# Patient Record
Sex: FEMALE | URBAN - METROPOLITAN AREA
[De-identification: names, ages, dates, MRNs, and addresses within clinical notes are randomized per-mention and may not be internally consistent; named-entity substitution may affect disease eponyms.]

---

## 2017-07-12 ENCOUNTER — OFFICE VISIT (OUTPATIENT)
Dept: FAMILY MEDICINE | Facility: CLINIC | Age: 47
End: 2017-07-12
Payer: COMMERCIAL

## 2017-07-12 VITALS — TEMPERATURE: 98.6 F | SYSTOLIC BLOOD PRESSURE: 108 MMHG | DIASTOLIC BLOOD PRESSURE: 62 MMHG

## 2017-07-12 DIAGNOSIS — Z71.84 TRAVEL ADVICE ENCOUNTER: Primary | ICD-10-CM

## 2017-07-12 DIAGNOSIS — Z23 NEED FOR VACCINATION: ICD-10-CM

## 2017-07-12 PROCEDURE — 99402 PREV MED CNSL INDIV APPRX 30: CPT | Mod: 25 | Performed by: NURSE PRACTITIONER

## 2017-07-12 PROCEDURE — 90471 IMMUNIZATION ADMIN: CPT | Mod: GA | Performed by: NURSE PRACTITIONER

## 2017-07-12 PROCEDURE — 86787 VARICELLA-ZOSTER ANTIBODY: CPT | Performed by: NURSE PRACTITIONER

## 2017-07-12 PROCEDURE — 86708 HEPATITIS A ANTIBODY: CPT | Performed by: NURSE PRACTITIONER

## 2017-07-12 PROCEDURE — 86704 HEP B CORE ANTIBODY TOTAL: CPT | Performed by: NURSE PRACTITIONER

## 2017-07-12 PROCEDURE — 87340 HEPATITIS B SURFACE AG IA: CPT | Performed by: NURSE PRACTITIONER

## 2017-07-12 PROCEDURE — 86706 HEP B SURFACE ANTIBODY: CPT | Performed by: NURSE PRACTITIONER

## 2017-07-12 PROCEDURE — 86765 RUBEOLA ANTIBODY: CPT | Performed by: NURSE PRACTITIONER

## 2017-07-12 PROCEDURE — 36415 COLL VENOUS BLD VENIPUNCTURE: CPT | Performed by: NURSE PRACTITIONER

## 2017-07-12 PROCEDURE — 90691 TYPHOID VACCINE IM: CPT | Mod: GA | Performed by: NURSE PRACTITIONER

## 2017-07-12 PROCEDURE — 90715 TDAP VACCINE 7 YRS/> IM: CPT | Mod: GA | Performed by: NURSE PRACTITIONER

## 2017-07-12 PROCEDURE — 90472 IMMUNIZATION ADMIN EACH ADD: CPT | Mod: GA | Performed by: NURSE PRACTITIONER

## 2017-07-12 RX ORDER — ATOVAQUONE AND PROGUANIL HYDROCHLORIDE 250; 100 MG/1; MG/1
1 TABLET, FILM COATED ORAL DAILY
Qty: 28 TABLET | Refills: 0 | Status: SHIPPED | OUTPATIENT
Start: 2017-07-12 | End: 2018-01-08

## 2017-07-12 RX ORDER — AZITHROMYCIN 500 MG/1
500 TABLET, FILM COATED ORAL DAILY
Qty: 3 TABLET | Refills: 0 | Status: SHIPPED | OUTPATIENT
Start: 2017-07-12 | End: 2017-07-15

## 2017-07-12 NOTE — PATIENT INSTRUCTIONS
Today July 12, 2017 you received the    Tetanus (Tdap) Vaccine    Typhoid - injectable. This vaccine is valid for two years.   .    These appointments can be made as a NURSE ONLY visit.    **It is very important for the vaccinations to be given on the scheduled day(s), this helps ensure you receive the full effectiveness of the vaccine.**    Please call [facility] with any questions [phone]    Thank you for visiting [facility]

## 2017-07-12 NOTE — PROGRESS NOTES
Nurse Note      Itinerary:  [facility]      Departure Date: 08/02/2017      Return Date: 08/23/2017      Length of Trip 3 weeks      Reason for Travel: Tourism           Urban or rural: rural      Accommodations: Family home        IMMUNIZATION HISTORY  Have you received any immunizations within the past 4 weeks?  No  Have you ever fainted from having your blood drawn or from an injection?  No  Have you ever had a fever reaction to vaccination?  No  Have you ever had any bad reaction or side effect from any vaccination?  No  Have you ever had hepatitis A or B vaccine?  Yes  Do you live (or work closely) with anyone who has AIDS, an AIDS-like condition, any other immune disorder or who is on chemotherapy for cancer?  No  Do you have a family history of immunodeficiency?  No  Have you received any injection of immune globulin or any blood products during the past 12 months?  No    Patient roomed by CELINA Lew NOMI Ramón is a 47 year old female seen today with children for counsultation for international travel to [facility] for Tourism  Visiting friends and relatives.  Patient will be departing in  3 week(s) and staying for   3 week(s) and  traveling with family member(s).      Patient itinerary :  will be in the urban  region of [facility] [place], [facility] [place] [facility] and other which presents risk for Malaria, Dengue Fever, Chikungungya, Zika, Schistosomiasis, Japanese Encephalitis, Rabies, food borne illnesses, motor vehicle accidents and Typhoid. exposure.      Patient's activities will include sightseeing and visiting friends and relatives.    Patient's country of birth is [facility]    Special medical concerns: none  Pre-travel questionnaire was completed by patient and reviewed by provider.     Vitals: /62  Temp 98.6  F (37  C) (Tympanic)  BMI= There is no height or weight on file to calculate BMI.    EXAM:  General:  Well-nourished, well-developed in no acute  distress.  Appears to be stated age, interacts appropriately and expresses understanding of information given to patient.    No current outpatient prescriptions on file.     Patient Active Problem List   Diagnosis     Dizziness and giddiness     No Known Allergies      Immunizations discussed include:   Hepatitis A:  Titers drawn  Hepatitis B: Titers drawn  Influenza: vaccine is not available  Typhoid: Ordered/given today, risks, benefits and side effects reviewed  Rabies: Declined  reviewed managment of a animal bite or scratch (washing wound, seek medical care within 24 hours for post exposure prophylaxis )  Yellow Fever: Not indicated  Japanese Encephalitis: insufficient time  Meningococcus: Not indicated  Tetanus/Diphtheria: Ordered/given today, risks, benefits and side effects reviewed  Measles/Mumps/Rubella: Titers drawn  Cholera: Not needed  Polio: Up to date  Pneumococcal: Under age of 65  Varicella: Titers drawn  Zostavax:  Not indicated  HPV:  Not indicated  TB:  Low risk     Altitude Exposure on this trip: no    ASSESSMENT/PLAN:    ICD-10-CM    1. Travel advice encounter Z71.89 TYPHOID VACCINE, IM     TDAP VACCINE (ADACEL)     Rubeola Antibody IgG     Hepatitis B Surface Antibody     Hepatitis B surface antigen     Hepatitis B core antibody     azithromycin (ZITHROMAX) 500 MG tablet     atovaquone-proguanil (MALARONE) 250-100 MG per tablet   2. Need for vaccination Z23 TYPHOID VACCINE, IM     TDAP VACCINE (ADACEL)     Rubeola Antibody IgG     Hepatitis B Surface Antibody     Hepatitis Antibody A IgG     Varicella Zoster Virus Antibody IgG     I have reviewed general recommendations for safe travel   including: food/water precautions, insect precautions, safer sex   practices given high prevalence of Zika, HIV and other STDs,   roadway safety. Educational materials and Travax report provided.    Malaraia prophylaxis recommended: Malarone  Symptomatic treatment for traveler's diarrhea:  azithromycin  Altitude illness prevention and treatment: no      Evacuation insurance advised and resources were provided to patient.    Total visit time 30 minutes  with over 50% of time spent counseling patient as detailed above.    Marcy Townsend CNP

## 2017-07-12 NOTE — NURSING NOTE
Chief Complaint   Patient presents with     Travel Clinic     /62  Temp 98.6  F (37  C) (Tympanic) There is no height or weight on file to calculate BMI.  Medication Reconciliation: complete      Health Maintenance due pending provider review:  NONE    n/a    Mora Baldwin CMA

## 2017-07-12 NOTE — MR AVS SNAPSHOT
"              After Visit Summary   7/12/2017    Gege Melgar    MRN: [id number]           Patient Information     Date Of Birth          1970        Visit Information        Provider Department      7/12/2017 2:00 PM Marcy Townsend APRN CNP [facility]        Today's Diagnoses     Travel advice encounter    -  1    Need for vaccination          Care Instructions    Today July 12, 2017 you received the    Tetanus (Tdap) Vaccine    Typhoid - injectable. This vaccine is valid for two years.   .    These appointments can be made as a NURSE ONLY visit.    **It is very important for the vaccinations to be given on the scheduled day(s), this helps ensure you receive the full effectiveness of the vaccine.**    Please call [facility] with any questions [phone]    Thank you for visiting [facility]              Follow-ups after your visit        Who to contact     If you have questions or need follow up information about today's clinic visit or your schedule please contact [facility] directly at [phone].  Normal or non-critical lab and imaging results will be communicated to you by MyChart, letter or phone within 4 business days after the clinic has received the results. If you do not hear from us within 7 days, please contact the clinic through Cooleafhart or phone. If you have a critical or abnormal lab result, we will notify you by phone as soon as possible.  Submit refill requests through RooT or call your pharmacy and they will forward the refill request to us. Please allow 3 business days for your refill to be completed.          Additional Information About Your Visit        MyChart Information     RooT lets you send messages to your doctor, view your test results, renew your prescriptions, schedule appointments and more. To sign up, go to [email or web address] . Click on \"Log in\" on the left side of the screen, which will take you " "to the Welcome page. Then click on \"Sign up Now\" on the right side of the page.     You will be asked to enter the access code listed below, as well as some personal information. Please follow the directions to create your username and password.     Your access code is: [email or web address]  Expires: 10/10/2017  3:15 PM     Your access code will  in 90 days. If you need help or a new code, please call your [place] clinic or [phone].        Care EveryWhere ID     This is your Care EveryWhere ID. This could be used by other organizations to access your [place] medical records  [id number]        Your Vitals Were     Temperature                   98.6  F (37  C) (Tympanic)            Blood Pressure from Last 3 Encounters:   17 108/62    Weight from Last 3 Encounters:   No data found for Wt              We Performed the Following     Hepatitis Antibody A IgG     Hepatitis B core antibody     Hepatitis B Surface Antibody     Hepatitis B surface antigen     Rubeola Antibody IgG     TDAP VACCINE (ADACEL)     TYPHOID VACCINE, IM     Varicella Zoster Virus Antibody IgG          Today's Medication Changes          These changes are accurate as of: 17  3:15 PM.  If you have any questions, ask your nurse or doctor.               Start taking these medicines.        Dose/Directions    atovaquone-proguanil 250-100 MG per tablet   Commonly known as:  MALARONE   Used for:  Travel advice encounter        Dose:  1 tablet   Take 1 tablet by mouth daily Start 2 days before exposure to Malaria and continue daily till  7 days after exposure.   Quantity:  28 tablet   Refills:  0       azithromycin 500 MG tablet   Commonly known as:  ZITHROMAX   Used for:  Travel advice encounter        Dose:  500 mg   Take 1 tablet (500 mg) by mouth daily for 3 doses Take 1 tablet a day for up to 3 days for severe diarrhea   Quantity:  3 tablet   Refills:  0            Where to get your medicines      These medications were sent to " [facility] - [place], [place] - [address] AT [facility] [address] [facility] [address], [facility] [address]     Phone:  [phone]     atovaquone-proguanil 250-100 MG per tablet    azithromycin 500 MG tablet                Primary Care Provider    None Specified       No primary provider on file.        Equal Access to Services     [facility]: Hadii aad ku hadasho Soomaali, waaxda luqadaha, qaybta kaalmada adeegyada, waxay idiin hayaan adeeg khroxann laDoloreslucretian . So [facility] [phone].    ATENCIÓN: Si habla español, tiene a pena disposición servicios gratuitos de asistencia lingüística. Juan Joséame al [phone].    We comply with applicable federal civil rights laws and [place] laws. We do not discriminate on the basis of race, color, national origin, age, disability sex, sexual orientation or gender identity.            Thank you!     Thank you for choosing [facility]  for your care. Our goal is always to provide you with excellent care. Hearing back from our patients is one way we can continue to improve our services. Please take a few minutes to complete the written survey that you may receive in the mail after your visit with us. Thank you!             Your Updated Medication List - Protect others around you: Learn how to safely use, store and throw away your medicines at www.disposemymeds.org.          This list is accurate as of: 7/12/17  3:15 PM.  Always use your most recent med list.                   Brand Name Dispense Instructions for use Diagnosis    atovaquone-proguanil 250-100 MG per tablet    MALARONE    28 tablet    Take 1 tablet by mouth daily Start 2 days before exposure to Malaria and continue daily till  7 days after exposure.    Travel advice encounter       azithromycin 500 MG tablet    ZITHROMAX    3 tablet    Take 1 tablet (500 mg) by mouth daily for 3 doses Take 1 tablet a day for up to 3 days for severe diarrhea    Travel advice encounter

## 2017-07-13 LAB
HAV IGG SER QL IA: ABNORMAL
HBV CORE AB SERPL QL IA: ABNORMAL
HBV SURFACE AB SERPL IA-ACNC: 810.67 M[IU]/ML
HBV SURFACE AG SERPL QL IA: NONREACTIVE

## 2017-07-14 LAB
MEV IGG SER QL IA: 2.9 AI (ref 0–0.8)
VZV IGG SER QL IA: 4 AI (ref 0–0.8)

## 2017-07-27 ENCOUNTER — TELEPHONE (OUTPATIENT)
Dept: FAMILY MEDICINE | Facility: CLINIC | Age: 47
End: 2017-07-27

## 2017-07-27 NOTE — LETTER
July 27, 2017    Gege Melgar  [address]  [facility] [address]      Dear Gege,    Your recent lab results show you are protected against Measles, Hepatitis A and B, and also Chick pox.  No further vaccinations for these are needed.    Thank you very much for choosing [facility]. Please call my office if you have any questions or concerns.      Sincerely,        Christine Townsend CNP

## 2017-07-27 NOTE — TELEPHONE ENCOUNTER
Staff message [facility] copied below.    Letter sent    Marcy Townsend APRN CNP  [facility]                   Please contact patient and advise her that her blood test shows that she is protected against Measles, Hepatitis A and B and Chicken pox.   I attempted to call patient but there was no voice mail at home number.   Thank you   Marcy Townsend CNP (Lori)

## 2017-12-31 ENCOUNTER — OFFICE VISIT (OUTPATIENT)
Dept: URGENT CARE | Facility: URGENT CARE | Age: 47
End: 2017-12-31
Payer: COMMERCIAL

## 2017-12-31 ENCOUNTER — RADIANT APPOINTMENT (OUTPATIENT)
Dept: GENERAL RADIOLOGY | Facility: CLINIC | Age: 47
End: 2017-12-31
Attending: FAMILY MEDICINE
Payer: COMMERCIAL

## 2017-12-31 VITALS
OXYGEN SATURATION: 100 % | DIASTOLIC BLOOD PRESSURE: 70 MMHG | TEMPERATURE: 98.7 F | HEART RATE: 90 BPM | RESPIRATION RATE: 16 BRPM | SYSTOLIC BLOOD PRESSURE: 108 MMHG

## 2017-12-31 DIAGNOSIS — M25.552 HIP PAIN, LEFT: Primary | ICD-10-CM

## 2017-12-31 LAB
CRP SERPL-MCNC: 17 MG/L (ref 0–8)
ERYTHROCYTE [SEDIMENTATION RATE] IN BLOOD BY WESTERGREN METHOD: 92 MM/H (ref 0–20)
WBC # BLD AUTO: 8.3 10E9/L (ref 4–11)

## 2017-12-31 PROCEDURE — 73502 X-RAY EXAM HIP UNI 2-3 VIEWS: CPT

## 2017-12-31 PROCEDURE — 99203 OFFICE O/P NEW LOW 30 MIN: CPT | Performed by: FAMILY MEDICINE

## 2017-12-31 PROCEDURE — 36415 COLL VENOUS BLD VENIPUNCTURE: CPT | Performed by: FAMILY MEDICINE

## 2017-12-31 PROCEDURE — 85048 AUTOMATED LEUKOCYTE COUNT: CPT | Performed by: FAMILY MEDICINE

## 2017-12-31 PROCEDURE — 85652 RBC SED RATE AUTOMATED: CPT | Performed by: FAMILY MEDICINE

## 2017-12-31 PROCEDURE — 86140 C-REACTIVE PROTEIN: CPT | Performed by: FAMILY MEDICINE

## 2017-12-31 RX ORDER — IBUPROFEN 800 MG/1
800 TABLET, FILM COATED ORAL EVERY 8 HOURS PRN
Qty: 21 TABLET | Refills: 0 | Status: SHIPPED | OUTPATIENT
Start: 2017-12-31 | End: 2018-01-07

## 2017-12-31 RX ORDER — HYDROCODONE BITARTRATE AND ACETAMINOPHEN 5; 325 MG/1; MG/1
1 TABLET ORAL EVERY 6 HOURS PRN
Qty: 12 TABLET | Refills: 0 | Status: SHIPPED | OUTPATIENT
Start: 2017-12-31 | End: 2018-01-03

## 2017-12-31 NOTE — PROGRESS NOTES
SUBJECTIVE:  Chief Complaint   Patient presents with     Pelvic Pain     LT side.pelvic pain x 3 days. No recent trauma or fall.   .ident who presents with a chief complaint of  left hip pain.  Symptoms began 3 day(s) ago , are moderate andstill present and worsening.  Context:Injury: no  Pain exacerbated by weight-bearing and movement   Relieved bynothing She treated it initially with no therapy.   This is the first time this type of injury has occurred to this patient.     History reviewed. No pertinent past medical history.    History reviewed. No pertinent surgical history.    History reviewed. No pertinent family history.    Social History   Substance Use Topics     Smoking status: Never Smoker     Smokeless tobacco: Never Used     Alcohol use No       ROS:CONSTITUTIONAL:NEGATIVE for fever, chills, change in weight  INTEGUMENTARY/SKIN: NEGATIVE for worrisome rashes, moles or lesions  GI: NEGATIVE for nausea, abdominal pain, heartburn, or change in bowel habits  : no dysuria/hematiuria    EXAM: /70 (BP Location: Right arm, Patient Position: Sitting, Cuff Size: Adult Regular)  Pulse 90  Temp 98.7  F (37.1  C) (Tympanic)  Resp 16  SpO2 100%   NAD  Exam:hip  tenderness to palpation and pain with rom  GENERAL APPEARANCE: healthy, alert and no distress  EXTREMITIES: peripheral pulses normal  SKIN: no suspicious lesions or rashes  NEURO: Normal strength and tone, sensory exam grossly normal, mentation intact and speech normal    Xray without acute findings, read by Jv Cruz D.O.    ASSESSMENT:   (M25.552) Hip pain, left  (primary encounter diagnosis)  Comment: suspect aseptic hip  Plan: WBC count, ESR: Erythrocyte sedimentation rate,        CRP, inflammation, XR Hip Left 2-3 Views,         ibuprofen (ADVIL/MOTRIN) 800 MG tablet,         HYDROcodone-acetaminophen (NORCO) 5-325 MG per         tablet, order for DME, ORTHOPEDICS ADULT         REFERRAL        Ice/rest  F/U ED if worse, Fevers/chills

## 2017-12-31 NOTE — MR AVS SNAPSHOT
After Visit Summary   12/31/2017    Gege Melgar    MRN: [id number]           Patient Information     Date Of Birth          1970        Visit Information        Provider Department      12/31/2017 1:50 PM Jv Cruz DO [facility]        Today's Diagnoses     Hip pain, left    -  1       Follow-ups after your visit        Additional Services     ORTHOPEDICS ADULT REFERRAL       Your provider has referred you to: [facility]: Orthopaedic Clinic - [place] [phone]   [email or web address]    [facility] [place] [phone]   [email or web address] [phone]   [email or web address]    Please be aware that coverage of these services is subject to the terms and limitations of your health insurance plan.  Call member services at your health plan with any benefit or coverage questions.      Please bring the following to your appointment:    >>   Any x-rays, CTs or MRIs which have been performed.  Contact the facility where they were done to arrange for  prior to your scheduled appointment.    >>   List of current medications   >>   This referral request   >>   Any documents/labs given to you for this referral                  Who to contact     If you have questions or need follow up information about today's clinic visit or your schedule please contact [facility] directly at [phone].  Normal or non-critical lab and imaging results will be communicated to you by MyChart, letter or phone within 4 business days after the clinic has received the results. If you do not hear from us within 7 days, please contact the clinic through MyChart or phone. If you have a critical or abnormal lab result, we will notify you by phone as soon as possible.  Submit refill requests through Mobvoi or call your pharmacy and they will forward the  "refill request to us. Please allow 3 business days for your refill to be completed.          Additional Information About Your Visit        DanceTrippinhart Information     [employer] lets you send messages to your doctor, view your test results, renew your prescriptions, schedule appointments and more. To sign up, go to [email or web address] . Click on \"Log in\" on the left side of the screen, which will take you to the Welcome page. Then click on \"Sign up Now\" on the right side of the page.     You will be asked to enter the access code listed below, as well as some personal information. Please follow the directions to create your username and password.     Your access code is: [email or web address]  Expires: 3/31/2018  3:23 PM     Your access code will  in 90 days. If you need help or a new code, please call your [place] clinic or [phone].        Care EveryWhere ID     This is your Care EveryWhere ID. This could be used by other organizations to access your [place] medical records  [id number]        Your Vitals Were     Pulse Temperature Respirations Pulse Oximetry          90 98.7  F (37.1  C) (Tympanic) 16 100%         Blood Pressure from Last 3 Encounters:   17 108/70   17 108/62    Weight from Last 3 Encounters:   No data found for Wt              We Performed the Following     CRP, inflammation     ESR: Erythrocyte sedimentation rate     ORTHOPEDICS ADULT REFERRAL     WBC count     XR Hip Left 2-3 Views          Today's Medication Changes          These changes are accurate as of: 17  3:23 PM.  If you have any questions, ask your nurse or doctor.               Start taking these medicines.        Dose/Directions    HYDROcodone-acetaminophen 5-325 MG per tablet   Commonly known as:  NORCO   Used for:  Hip pain, left   Started by:  Jv Cruz DO        Dose:  1 tablet   Take 1 tablet by mouth every 6 hours as needed   Quantity:  12 tablet   Refills:  0       ibuprofen 800 MG tablet "   Commonly known as:  ADVIL/MOTRIN   Used for:  Hip pain, left   Started by:  Jv Cruz DO        Dose:  800 mg   Take 1 tablet (800 mg) by mouth every 8 hours as needed for moderate pain   Quantity:  21 tablet   Refills:  0       order for DME   Used for:  Hip pain, left   Started by:  Jv Cruz DO        crutches   Quantity:  1 Device   Refills:  0            Where to get your medicines      These medications were sent to [address] [address]     Phone:  [phone]     ibuprofen 800 MG tablet         Some of these will need a paper prescription and others can be bought over the counter.  Ask your nurse if you have questions.     Bring a paper prescription for each of these medications     HYDROcodone-acetaminophen 5-325 MG per tablet    order for DME                Primary Care Provider Fax #    Physician No Ref-Primary [phone]       No address on file        Equal Access to Services     TAYLER DONOVAN : Martha laguerreo Soangela, waaxda luqadaha, qaybta kaalmada ademeenayada, ehsan grayson . So [facility] [phone].    ATENCIÓN: Si habla español, tiene a pena disposición servicios gratuitos de asistencia lingüística. Llame al [phone].    We comply with applicable federal civil rights laws and [place] laws. We do not discriminate on the basis of race, color, national origin, age, disability, sex, sexual orientation, or gender identity.            Thank you!     Thank you for choosing [facility]  for your care. Our goal is always to provide you with excellent care. Hearing back from our patients is one way we can continue to improve our services. Please take a few minutes to complete the written survey that you may receive in the mail after your visit with us. Thank you!             Your Updated Medication List - Protect others around you: Learn how to  safely use, store and throw away your medicines at www.disposemymeds.org.          This list is accurate as of: 12/31/17  3:23 PM.  Always use your most recent med list.                   Brand Name Dispense Instructions for use Diagnosis    atovaquone-proguanil 250-100 MG per tablet    MALARONE    28 tablet    Take 1 tablet by mouth daily Start 2 days before exposure to Malaria and continue daily till  7 days after exposure.    Travel advice encounter       HYDROcodone-acetaminophen 5-325 MG per tablet    NORCO    12 tablet    Take 1 tablet by mouth every 6 hours as needed    Hip pain, left       ibuprofen 800 MG tablet    ADVIL/MOTRIN    21 tablet    Take 1 tablet (800 mg) by mouth every 8 hours as needed for moderate pain    Hip pain, left       order for DME     1 Device    crutches    Hip pain, left

## 2017-12-31 NOTE — NURSING NOTE
Chief Complaint   Patient presents with     Pelvic Pain     LT side.pelvic pain x 3 days. No recent trauma or fall.       Initial /70 (BP Location: Right arm, Patient Position: Sitting, Cuff Size: Adult Regular)  Pulse 90  Temp 98.7  F (37.1  C) (Tympanic)  Resp 16  SpO2 100% There is no height or weight on file to calculate BMI.  Medication Reconciliation: complete     Princess KURT Elkins, CMA

## 2018-01-08 ENCOUNTER — OFFICE VISIT (OUTPATIENT)
Dept: INTERNAL MEDICINE | Facility: CLINIC | Age: 48
End: 2018-01-08
Payer: COMMERCIAL

## 2018-01-08 VITALS
HEART RATE: 85 BPM | DIASTOLIC BLOOD PRESSURE: 70 MMHG | TEMPERATURE: 98.3 F | OXYGEN SATURATION: 99 % | HEIGHT: 63 IN | SYSTOLIC BLOOD PRESSURE: 120 MMHG | WEIGHT: 121.3 LBS | BODY MASS INDEX: 21.49 KG/M2

## 2018-01-08 DIAGNOSIS — D50.9 MICROCYTIC ANEMIA: Primary | ICD-10-CM

## 2018-01-08 DIAGNOSIS — Z12.39 SCREENING FOR BREAST CANCER: ICD-10-CM

## 2018-01-08 DIAGNOSIS — Z13.220 SCREENING FOR CHOLESTEROL LEVEL: ICD-10-CM

## 2018-01-08 DIAGNOSIS — Z76.89 ENCOUNTER TO ESTABLISH CARE: ICD-10-CM

## 2018-01-08 DIAGNOSIS — D50.9 MICROCYTIC ANEMIA: ICD-10-CM

## 2018-01-08 DIAGNOSIS — R70.0 ELEVATED ERYTHROCYTE SEDIMENTATION RATE: Primary | ICD-10-CM

## 2018-01-08 DIAGNOSIS — Z13.1 SCREENING FOR DIABETES MELLITUS: ICD-10-CM

## 2018-01-08 DIAGNOSIS — M25.552 HIP PAIN, LEFT: ICD-10-CM

## 2018-01-08 DIAGNOSIS — R79.82 ELEVATED C-REACTIVE PROTEIN: ICD-10-CM

## 2018-01-08 LAB
ALBUMIN SERPL-MCNC: 3.5 G/DL (ref 3.4–5)
ALP SERPL-CCNC: 73 U/L (ref 40–150)
ALT SERPL W P-5'-P-CCNC: 16 U/L (ref 0–50)
ANION GAP SERPL CALCULATED.3IONS-SCNC: 7 MMOL/L (ref 3–14)
AST SERPL W P-5'-P-CCNC: 11 U/L (ref 0–45)
BILIRUB SERPL-MCNC: 0.2 MG/DL (ref 0.2–1.3)
BUN SERPL-MCNC: 13 MG/DL (ref 7–30)
CALCIUM SERPL-MCNC: 8.6 MG/DL (ref 8.5–10.1)
CHLORIDE SERPL-SCNC: 105 MMOL/L (ref 94–109)
CO2 SERPL-SCNC: 27 MMOL/L (ref 20–32)
CREAT SERPL-MCNC: 0.54 MG/DL (ref 0.52–1.04)
CRP SERPL-MCNC: <2.9 MG/L (ref 0–8)
ERYTHROCYTE [DISTWIDTH] IN BLOOD BY AUTOMATED COUNT: 18.5 % (ref 10–15)
ERYTHROCYTE [SEDIMENTATION RATE] IN BLOOD BY WESTERGREN METHOD: 87 MM/H (ref 0–20)
GFR SERPL CREATININE-BSD FRML MDRD: >90 ML/MIN/1.7M2
GLUCOSE SERPL-MCNC: 89 MG/DL (ref 70–99)
HCT VFR BLD AUTO: 29.3 % (ref 35–47)
HGB BLD-MCNC: 9 G/DL (ref 11.7–15.7)
MCH RBC QN AUTO: 21.9 PG (ref 26.5–33)
MCHC RBC AUTO-ENTMCNC: 30.7 G/DL (ref 31.5–36.5)
MCV RBC AUTO: 71 FL (ref 78–100)
PLATELET # BLD AUTO: 391 10E9/L (ref 150–450)
POTASSIUM SERPL-SCNC: 4 MMOL/L (ref 3.4–5.3)
PROT SERPL-MCNC: 8 G/DL (ref 6.8–8.8)
RBC # BLD AUTO: 4.11 10E12/L (ref 3.8–5.2)
SODIUM SERPL-SCNC: 139 MMOL/L (ref 133–144)
TSH SERPL DL<=0.005 MIU/L-ACNC: 2.74 MU/L (ref 0.4–4)
WBC # BLD AUTO: 5.5 10E9/L (ref 4–11)

## 2018-01-08 PROCEDURE — 84443 ASSAY THYROID STIM HORMONE: CPT | Performed by: INTERNAL MEDICINE

## 2018-01-08 PROCEDURE — 36415 COLL VENOUS BLD VENIPUNCTURE: CPT | Performed by: INTERNAL MEDICINE

## 2018-01-08 PROCEDURE — 80053 COMPREHEN METABOLIC PANEL: CPT | Performed by: INTERNAL MEDICINE

## 2018-01-08 PROCEDURE — 83540 ASSAY OF IRON: CPT | Performed by: INTERNAL MEDICINE

## 2018-01-08 PROCEDURE — 86140 C-REACTIVE PROTEIN: CPT | Performed by: INTERNAL MEDICINE

## 2018-01-08 PROCEDURE — 85027 COMPLETE CBC AUTOMATED: CPT | Performed by: INTERNAL MEDICINE

## 2018-01-08 PROCEDURE — 83550 IRON BINDING TEST: CPT | Performed by: INTERNAL MEDICINE

## 2018-01-08 PROCEDURE — 85652 RBC SED RATE AUTOMATED: CPT | Performed by: INTERNAL MEDICINE

## 2018-01-08 PROCEDURE — 99214 OFFICE O/P EST MOD 30 MIN: CPT | Performed by: INTERNAL MEDICINE

## 2018-01-08 PROCEDURE — 82728 ASSAY OF FERRITIN: CPT | Performed by: INTERNAL MEDICINE

## 2018-01-08 NOTE — PROGRESS NOTES
SUBJECTIVE:                                                      HPI: Gege Melgar is a pleasant 47 year old female who presents for urgent care follow-up:    Was seen in urgent care a couple weeks ago for left hip pain that started after a fall (onto her left hip).    Left hip x-ray unremarkable and patient's pain has since resolved.    During evaluation for left hip pain, ESR and CRP were checked and noted to be elevated.    Patient was directed to primary care for further evaluation.    She feels well today and has no clinical complaints.    ---    PMH, PSH, FH, SH, medications, allergies, immunizations, and preventative health measures reviewed.     Past Medical History:   Diagnosis Date     NO ACTIVE PROBLEMS      Past Surgical History:   Procedure Laterality Date      SECTION  ,      Family History   Problem Relation Age of Onset     DIABETES No family hx of      Myocardial Infarction No family hx of      CEREBROVASCULAR DISEASE No family hx of      Coronary Artery Disease Early Onset No family hx of      Breast Cancer No family hx of      Ovarian Cancer No family hx of      Colon Cancer No family hx of      Occupational History     Cook at [employer]      Social History Main Topics     Smoking status: Never Smoker     Smokeless tobacco: Never Used     Alcohol use No     Drug use: No     Sexual activity: Yes     Partners: Male     Social History Narrative    .    Two daughters - 11 and 8 as of 2018.     Treadmill walking - 10-15 minutes/day.     No Known Allergies     MEDS: None    Immunization History   Administered Date(s) Administered     Hepatitis A Immunity: Titer/MD Dx 2017     Hepatitis B Immunity: Titer 2017     Influenza Intranasal Vaccine 4 valent 2017     MMR Not Indicated - By Titer 2017     TDAP Vaccine (Adacel) 2017     Typhoid IM 2017     Varicella Immunity: Titer/MD Dx 2017     OBJECTIVE:                                       "                /70 (BP Location: Left arm, Patient Position: Chair, Cuff Size: Adult Regular)  Pulse 85  Temp 98.3  F (36.8  C) (Oral)  Ht 5' 2.5\" (1.588 m)  Wt 121 lb 4.8 oz (55 kg)  LMP 12/12/2017 (Approximate)  SpO2 99%  Breastfeeding? No  BMI 21.83 kg/m2  Constitutional: well-appearing  Psych: normal judgment and insight; normal mood and affect; recent and remote memory intact; oriented to time, place, and person    PREVENTATIVE HEALTH                                                      BMI: within normal limits  Blood pressure: within normal limits  Breast CA screening: DUE  Cervical CA screening: DUE  Colon CA screening: not indicated at this time  Lung CA screening: n/a  Dexa: not indicated at this time  Screening HCV: n/a  Screening cholesterol: DUE  Screening diabetes: DUE  STD testing: no risk factors present  Depression screening: PHQ-2 assessment completed and reviewed - no intervention indicated at this time  Alcohol misuse screening: alcohol use reviewed - no intervention indicated at this time  Immunizations: reviewed; up-to-date    ASSESSMENT/PLAN:                                                       (R70.0) Elevated erythrocyte sedimentation rate  (primary encounter diagnosis)  (R79.82) Elevated C-reactive protein  Comment:    - etiology unclear.   - patient completely asymptomatic.  Plan:    - CRP and ESR today.   - CBC, CMP, TSH reflex today.    (M25.552) Hip pain, left  Comment: resolved.    (Z76.89) Encounter to establish care  (Z13.220) Screening for cholesterol level  (Z13.1) Screening for diabetes mellitus  Comment: PMH, PSH, FH, SH, medications, allergies, immunizations, and preventative health measures reviewed.   Plan: patient to return for physical, pap smear, and fasting labs (glucose, lipids) when able.    (Z12.31) Screening for breast cancer  Plan: mammogram ordered - patient to schedule.    The instructions on the AVS were discussed and explained to the patient. " Patient expressed understanding of instructions.    A total of 25 minutes were spent face-to-face with this patient during this encounter and over half of that time was spent on counseling and coordination of care re: above diagnoses and plans of care.     (Chart documentation was completed, in part, with "[employer] voice-recognition software. Even though reviewed, some grammatical, spelling, and word errors may remain.)    Lea Nieto MD   [address] [address]  T: [phone], F: [phone]

## 2018-01-08 NOTE — PATIENT INSTRUCTIONS
Can schedule mammogram on your way out.    Can also schedule fasting labs on your way out.     Please also schedule follow-up physical with pap smear on your way out.    ---    (Non-fasting) labs downstairs today.

## 2018-01-08 NOTE — MR AVS SNAPSHOT
"              After Visit Summary   1/8/2018    Gege Melgar    MRN: [id number]           Patient Information     Date Of Birth          1970        Visit Information        Provider Department      1/8/2018 2:00 PM Lea Nieto MD [facility]        Today's Diagnoses     Screening for breast cancer    -  1    Elevated erythrocyte sedimentation rate        Elevated C-reactive protein          Care Instructions    Can schedule mammogram on your way out.    Can also schedule fasting labs on your way out.     Please also schedule follow-up physical with pap smear on your way out.    ---    (Non-fasting) labs downstairs today.           Follow-ups after your visit        Future tests that were ordered for you today     Open Future Orders        Priority Expected Expires Ordered    MA Screening Digital Bilateral Routine  1/9/2019 1/8/2018            Who to contact     If you have questions or need follow up information about today's clinic visit or your schedule please contact [facility] directly at [phone].  Normal or non-critical lab and imaging results will be communicated to you by NEMOPTIChart, letter or phone within 4 business days after the clinic has received the results. If you do not hear from us within 7 days, please contact the clinic through [employer] or phone. If you have a critical or abnormal lab result, we will notify you by phone as soon as possible.  Submit refill requests through [employer] or call your pharmacy and they will forward the refill request to us. Please allow 3 business days for your refill to be completed.          Additional Information About Your Visit        NEMOPTIChart Information     [employer] lets you send messages to your doctor, view your test results, renew your prescriptions, schedule appointments and more. To sign up, go to [email or web address] . Click on \"Log in\" on the left side of the screen, which will take you to the " "Welcome page. Then click on \"Sign up Now\" on the right side of the page.     You will be asked to enter the access code listed below, as well as some personal information. Please follow the directions to create your username and password.     Your access code is: [email or web address]  Expires: 3/31/2018  3:23 PM     Your access code will  in 90 days. If you need help or a new code, please call your [facility] clinic or [phone].        Care EveryWhere ID     This is your Care EveryWhere ID. This could be used by other organizations to access your [facility] medical records  [id number]        Your Vitals Were     Pulse Temperature Height Last Period Pulse Oximetry Breastfeeding?    85 98.3  F (36.8  C) (Oral) 5' 2.5\" (1.588 m) 2017 (Approximate) 99% No    BMI (Body Mass Index)                   21.83 kg/m2            Blood Pressure from Last 3 Encounters:   18 120/70   17 108/70   17 108/62    Weight from Last 3 Encounters:   18 121 lb 4.8 oz (55 kg)              We Performed the Following     CBC with platelets     Comprehensive metabolic panel     CRP, inflammation     Erythrocyte sedimentation rate auto     TSH with free T4 reflex        Primary Care Provider Fax #    Physician No Ref-Primary [phone]       No address on file        Equal Access to Services     TAYLER DONOVAN : Hadii tien ku hadranjito Soangela, waaxda luqadaha, qaybta kaalmada sivakumar, ehsan grayson . So [facility] [phone].    ATENCIÓN: Si habla español, tiene a pena disposición servicios gratuitos de asistencia lingüística. Llame al [phone].    We comply with applicable federal civil rights laws and [place] laws. We do not discriminate on the basis of race, color, national origin, age, disability, sex, sexual orientation, or gender identity.            Thank you!     Thank you for choosing [facility]  for your care. Our goal is always to provide you with " excellent care. Hearing back from our patients is one way we can continue to improve our services. Please take a few minutes to complete the written survey that you may receive in the mail after your visit with us. Thank you!             Your Updated Medication List - Protect others around you: Learn how to safely use, store and throw away your medicines at www.disposemymeds.org.      Notice  As of 1/8/2018  2:19 PM    You have not been prescribed any medications.

## 2018-01-09 LAB
FERRITIN SERPL-MCNC: 4 NG/ML (ref 8–252)
IRON SATN MFR SERPL: 4 % (ref 15–46)
IRON SERPL-MCNC: 14 UG/DL (ref 35–180)
TIBC SERPL-MCNC: 373 UG/DL (ref 240–430)

## 2018-01-10 DIAGNOSIS — Z13.220 SCREENING FOR CHOLESTEROL LEVEL: ICD-10-CM

## 2018-01-10 DIAGNOSIS — Z13.1 SCREENING FOR DIABETES MELLITUS: ICD-10-CM

## 2018-01-10 LAB
CHOLEST SERPL-MCNC: 167 MG/DL
GLUCOSE SERPL-MCNC: 76 MG/DL (ref 70–99)
HDLC SERPL-MCNC: 56 MG/DL
LDLC SERPL CALC-MCNC: 90 MG/DL
NONHDLC SERPL-MCNC: 111 MG/DL
TRIGL SERPL-MCNC: 106 MG/DL

## 2018-01-10 PROCEDURE — 80061 LIPID PANEL: CPT | Performed by: INTERNAL MEDICINE

## 2018-01-10 PROCEDURE — 36415 COLL VENOUS BLD VENIPUNCTURE: CPT | Performed by: INTERNAL MEDICINE

## 2018-01-10 PROCEDURE — 82947 ASSAY GLUCOSE BLOOD QUANT: CPT | Performed by: INTERNAL MEDICINE

## 2018-01-23 ENCOUNTER — OFFICE VISIT (OUTPATIENT)
Dept: INTERNAL MEDICINE | Facility: CLINIC | Age: 48
End: 2018-01-23
Payer: COMMERCIAL

## 2018-01-23 VITALS
TEMPERATURE: 98 F | HEIGHT: 63 IN | BODY MASS INDEX: 21.44 KG/M2 | OXYGEN SATURATION: 100 % | DIASTOLIC BLOOD PRESSURE: 70 MMHG | WEIGHT: 121 LBS | HEART RATE: 87 BPM | SYSTOLIC BLOOD PRESSURE: 124 MMHG

## 2018-01-23 DIAGNOSIS — Z12.4 SCREENING FOR CERVICAL CANCER: ICD-10-CM

## 2018-01-23 DIAGNOSIS — D50.0 IRON DEFICIENCY ANEMIA DUE TO CHRONIC BLOOD LOSS: ICD-10-CM

## 2018-01-23 DIAGNOSIS — Z00.00 ROUTINE HISTORY AND PHYSICAL EXAMINATION OF ADULT: Primary | ICD-10-CM

## 2018-01-23 PROCEDURE — 99396 PREV VISIT EST AGE 40-64: CPT | Performed by: INTERNAL MEDICINE

## 2018-01-23 PROCEDURE — G0145 SCR C/V CYTO,THINLAYER,RESCR: HCPCS | Performed by: INTERNAL MEDICINE

## 2018-01-23 PROCEDURE — 87624 HPV HI-RISK TYP POOLED RSLT: CPT | Performed by: INTERNAL MEDICINE

## 2018-01-23 RX ORDER — ASCORBIC ACID 500 MG
500 TABLET ORAL DAILY
Qty: 90 TABLET | Refills: 3 | Status: SHIPPED | OUTPATIENT
Start: 2018-01-23

## 2018-01-23 NOTE — MR AVS SNAPSHOT
"              After Visit Summary   1/23/2018    Gege Melgar    MRN: [id number]           Patient Information     Date Of Birth          1970        Visit Information        Provider Department      1/23/2018 2:45 PM Lea Nieto MD [facility]        Today's Diagnoses     Screening for cervical cancer    -  1    Iron deficiency anemia due to chronic blood loss          Care Instructions    Pap smear results take ~1 week to come back.    ---    Prescription for slow-release iron and vitamin C supplement (take together once a day) sent to pharmacy.    ---          Follow-ups after your visit        Who to contact     If you have questions or need follow up information about today's clinic visit or your schedule please contact [facility] directly at [phone].  Normal or non-critical lab and imaging results will be communicated to you by MyChart, letter or phone within 4 business days after the clinic has received the results. If you do not hear from us within 7 days, please contact the clinic through MyChart or phone. If you have a critical or abnormal lab result, we will notify you by phone as soon as possible.  Submit refill requests through [employer] or call your pharmacy and they will forward the refill request to us. Please allow 3 business days for your refill to be completed.          Additional Information About Your Visit        MyChart Information     [employer]Out lets you send messages to your doctor, view your test results, renew your prescriptions, schedule appointments and more. To sign up, go to [email or web address] . Click on \"Log in\" on the left side of the screen, which will take you to the Welcome page. Then click on \"Sign up Now\" on the right side of the page.     You will be asked to enter the access code listed below, as well as some personal information. Please follow the directions to create your username and password.     Your " "access code is: [email or web address]  Expires: 3/31/2018  3:23 PM     Your access code will  in 90 days. If you need help or a new code, please call your [place] clinic or [phone].        Care EveryWhere ID     This is your Care EveryWhere ID. This could be used by other organizations to access your [place] [facility] medical records  [id number]        Your Vitals Were     Pulse Temperature Height Last Period Pulse Oximetry Breastfeeding?    87 98  F (36.7  C) (Oral) 5' 2.5\" (1.588 m) 2018 (Approximate) 100% No    BMI (Body Mass Index)                   21.78 kg/m2            Blood Pressure from Last 3 Encounters:   18 124/70   18 120/70   17 108/70    Weight from Last 3 Encounters:   18 121 lb (54.9 kg)   18 121 lb 4.8 oz (55 kg)              We Performed the Following     HPV High Risk Types DNA Cervical     Pap imaged thin layer screen with HPV - recommended age 30 - 65 years (select HPV order below)          Today's Medication Changes          These changes are accurate as of: 18  3:19 PM.  If you have any questions, ask your nurse or doctor.               Start taking these medicines.        Dose/Directions    ascorbic acid 500 MG tablet   Commonly known as:  VITAMIN C   Used for:  Iron deficiency anemia due to chronic blood loss   Started by:  Lea Nieto MD        Dose:  500 mg   Take 1 tablet (500 mg) by mouth daily   Quantity:  90 tablet   Refills:  3       ferrous sulfate 142 (45 FE) MG Tbcr   Commonly known as:  SLO-FE   Used for:  Iron deficiency anemia due to chronic blood loss   Started by:  Lea Nieto MD        Dose:  142 mg   Take 1 tablet (142 mg) by mouth daily   Quantity:  90 tablet   Refills:  3            Where to get your medicines      These medications were sent to [facility] [address] [address]     Phone:  [phone]     ascorbic acid 500 MG tablet    ferrous sulfate " 142 (45 FE) MG Tbcr                Primary Care Provider Fax #    Physician No Ref-Primary [phone]       No address on file        Equal Access to Services     TAYLER DONOVAN : Hadii aad ku hadlucila Beverly, leo chiraglatoya, divina shaver, ehsan rhodes. So [facility] [phone].    ATENCIÓN: Si habla español, tiene a pena disposición servicios gratuitos de asistencia lingüística. Llame al [phone].    We comply with applicable federal civil rights laws and [place] laws. We do not discriminate on the basis of race, color, national origin, age, disability, sex, sexual orientation, or gender identity.            Thank you!     Thank you [place] [facility] [place] [place] your [place]. Our goal is always to provide you with excellent care. Hearing back from our patients is one way we can continue to improve our services. Please take a few minutes to complete the written survey that you may receive in the mail after your visit with us. Thank you!             Your Updated Medication List - Protect others around you: Learn how to safely use, store and throw away your medicines at www.disposemymeds.org.          This list is accurate as of: 1/23/18  3:19 PM.  Always use your most recent med list.                   Brand Name Dispense Instructions for use Diagnosis    ascorbic acid 500 MG tablet    VITAMIN C    90 tablet    Take 1 tablet (500 mg) by mouth daily    Iron deficiency anemia due to chronic blood loss       ferrous sulfate 142 (45 FE) MG Tbcr    SLO-FE    90 tablet    Take 1 tablet (142 mg) by mouth daily    Iron deficiency anemia due to chronic blood loss

## 2018-01-23 NOTE — PROGRESS NOTES
SUBJECTIVE:                                                      HPI: Gege Melgar is a pleasant 47 year old female who presents for a physical.    No specific complaints, concerns, or questions.    Recent labs reviewed with patient.     Significant for severe iron deficiency anemia likely from menstruation (she is not yet arron-menopausal/menopausal).    ESR improved but still elevated.     CRP normalized.     Suspect elevations are due to severe iron deficiency anemia.     Screenings for diabetes and cholesterol were within normal limits.    ROS:  Constitutional: denies unintentional weight loss or gain; denies fevers, chills, or sweats     Cardiovascular: denies chest pain, palpitations, or edema  Respiratory: denies cough, wheezing, shortness of breath, or dyspnea on exertion  Gastrointestinal: denies nausea, vomiting, constipation, diarrhea, or abdominal pain  Genitourinary: denies urinary frequency, urgency, dysuria, or hematuria  Integumentary: denies rash or pruritus  Musculoskeletal: denies back pain, muscle pain, joint pain, or joint swelling  Neurologic: denies focal weakness, numbness, or tingling  Hematologic/Immunologic: denies history of anemia or blood transfusions  Endocrine: denies heat or cold intolerance; denies polyuria, polydipsia  Psychiatric: denies anxiety; see preventative health below    Past Medical History:   Diagnosis Date     NO ACTIVE PROBLEMS      Past Surgical History:   Procedure Laterality Date      SECTION  2009     Family History   Problem Relation Age of Onset     DIABETES No family hx of      Myocardial Infarction No family hx of      CEREBROVASCULAR DISEASE No family hx of      Coronary Artery Disease Early Onset No family hx of      Breast Cancer No family hx of      Ovarian Cancer No family hx of      Colon Cancer No family hx of      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    Two daughters - 11 and 8 as of 2018.     Treadmill walking - 10-15 minutes/day.     No Known Allergies     MEDS: None    Immunization History   Administered Date(s) Administered     Hepatitis A Immunity: Titer/MD Dx 07/12/2017     Hepatitis B Immunity: Titer 07/12/2017     Influenza Intranasal Vaccine 4 valent 11/01/2017     MMR Not Indicated - By Titer 07/12/2017     TDAP Vaccine (Adacel) 07/12/2017     Typhoid IM 07/12/2017     Varicella Immunity: Titer/MD Dx 07/12/2017     OBJECTIVE:                                                      /70 (BP Location: Left arm, Patient Position: Chair, Cuff Size: Adult Regular)  Pulse 87  Temp 98  F (36.7  C) (Oral)  Ht 5' 2.5\" (1.588 m)  Wt 121 lb (54.9 kg)  LMP 01/19/2018 (Approximate)  SpO2 100%  Breastfeeding? No  BMI 21.78 kg/m2  Constitutional: well-appearing  Eyes: normal conjunctivae and lids; pupils equal, round, and reactive to light  Ears, Nose, Mouth, and Throat: normal ears and nose; tympanic membranes visualized and normal; normal lips, teeth, and gums; no oropharyngeal lesions or ulcers  Neck: supple and symmetric; no lymphadenopathy; no thyromegaly or masses  Respiratory: normal respiratory effort; clear to auscultation bilaterally  Cardiovascular: regular rate and rhythm; pedal pulses palpable; no edema  Breasts: normal appearance; no masses or skin retraction; no nipple discharge or bleeding; no axillary lymphadenopathy  Gastrointestinal: soft, non-tender, non-distended, and bowel sounds present; no organomegaly or masses  Genitourinary: external genitalia, urethral meatus, and vagina normal; cervix visualized and normal in appearance  Musculoskeletal: normal gait and station  Psych: normal judgment and insight; normal mood and affect; recent and remote memory intact; oriented to time, place, and " person     PREVENTATIVE HEALTH         BMI: within normal limits  Blood pressure: within normal limits  Breast CA screening: DUE - ordered at last visit - patient to schedule  Cervical CA screening: DUE  Colon CA screening: not indicated at this time  Lung CA screening: n/a  Dexa: not indicated at this time  Screening HCV: n/a  Screening cholesterol: up to date  Screening diabetes: up to date  STD testing: no risk factors present  Depression screening: PHQ-2 assessment completed and reviewed - no intervention indicated at this time  Alcohol misuse screening: alcohol use reviewed - no intervention indicated at this time  Immunizations: reviewed; up to date    ASSESSMENT/PLAN:                                                       (Z00.00) Routine history and physical examination of adult  (primary encounter diagnosis)  Comment: PMH, PSH, FH, SH, medications, allergies, immunizations, and preventative health measures reviewed.   Plan: see below for plans.     (Z12.4) Screening for cervical cancer  Plan: pap smear obtained tonight.     (D50.0) Iron deficiency anemia due to chronic blood loss  Plan:    - extended release iron supplement + vitamin C daily x 3 months.   - follow-up CBC and iron studies (asn ESR and CRP) in 3 months.    The instructions on the AVS were discussed and explained to the patient. Patient expressed understanding of instructions.    (Chart documentation was completed, in part, with [employer] voice-recognition software. Even though reviewed, some grammatical, spelling, and word errors may remain.)    Lea Nieto MD   [address] [address]  T: [phone], F: [phone]

## 2018-01-23 NOTE — PATIENT INSTRUCTIONS
Pap smear results take ~1 week to come back.    ---    Prescription for slow-release iron and vitamin C supplement (take together once a day) sent to pharmacy.    ---

## 2018-01-23 NOTE — LETTER
February 6, 2018    Gege Melgar  [address]  [place] [address]    Dear Gege,  We are happy to inform you that your PAP smear result from 1/23/18 is normal.  We are now able to do a follow up test on PAP smears. The DNA test is for HPV (Human Papilloma Virus). Cervical cancer is closely linked with certain types of HPV. Your result showed no evidence of HPV.   The presence of endometrial cells was seen on your current pap smear.  This is most likely due to your menstrual cycle.  No follow up is recommended unless you have irregular periods or spotting/bleeding in between your cycles.  If this occurs, please contact the clinic for a follow up appointment.   Therefore, we recommend you return in 1 year for your next physical exam and pap smear.  Please contact the clinic at [phone] with any questions.  Sincerely,    Lea Nieto MD/Lynette Nissen RN

## 2018-02-01 LAB
COPATH REPORT: NORMAL
FINAL DIAGNOSIS: NORMAL
HPV HR 12 DNA CVX QL NAA+PROBE: NEGATIVE
HPV16 DNA SPEC QL NAA+PROBE: NEGATIVE
HPV18 DNA SPEC QL NAA+PROBE: NEGATIVE
PAP: NORMAL
SPECIMEN DESCRIPTION: NORMAL
SPECIMEN SOURCE CVX/VAG CYTO: NORMAL